# Patient Record
Sex: MALE | Race: WHITE | NOT HISPANIC OR LATINO | URBAN - METROPOLITAN AREA
[De-identification: names, ages, dates, MRNs, and addresses within clinical notes are randomized per-mention and may not be internally consistent; named-entity substitution may affect disease eponyms.]

---

## 2023-07-17 ENCOUNTER — TELEPHONE (OUTPATIENT)
Dept: PRIMARY CARE | Facility: CLINIC | Age: 33
End: 2023-07-17

## 2023-07-17 ENCOUNTER — TELEPHONE (OUTPATIENT)
Dept: PRIMARY CARE | Facility: CLINIC | Age: 33
End: 2023-07-17
Payer: COMMERCIAL

## 2023-07-17 DIAGNOSIS — Z86.19 HISTORY OF COLD SORES: ICD-10-CM

## 2023-07-17 RX ORDER — ACYCLOVIR 200 MG/1
200 CAPSULE ORAL
Qty: 25 CAPSULE | Refills: 0 | Status: SHIPPED | OUTPATIENT
Start: 2023-07-17 | End: 2023-07-22

## 2023-08-18 ENCOUNTER — OFFICE VISIT (OUTPATIENT)
Dept: PRIMARY CARE | Facility: CLINIC | Age: 33
End: 2023-08-18
Payer: COMMERCIAL

## 2023-08-18 VITALS
HEART RATE: 75 BPM | DIASTOLIC BLOOD PRESSURE: 67 MMHG | SYSTOLIC BLOOD PRESSURE: 127 MMHG | BODY MASS INDEX: 24.45 KG/M2 | HEIGHT: 75 IN | OXYGEN SATURATION: 98 % | WEIGHT: 196.6 LBS

## 2023-08-18 DIAGNOSIS — A60.00 HERPES SIMPLEX INFECTION OF GENITOURINARY SYSTEM: ICD-10-CM

## 2023-08-18 DIAGNOSIS — Z00.01 ANNUAL VISIT FOR GENERAL ADULT MEDICAL EXAMINATION WITH ABNORMAL FINDINGS: Primary | ICD-10-CM

## 2023-08-18 PROCEDURE — 99395 PREV VISIT EST AGE 18-39: CPT | Performed by: INTERNAL MEDICINE

## 2023-08-18 PROCEDURE — 1036F TOBACCO NON-USER: CPT | Performed by: INTERNAL MEDICINE

## 2023-08-18 PROCEDURE — 99213 OFFICE O/P EST LOW 20 MIN: CPT | Performed by: INTERNAL MEDICINE

## 2023-08-18 RX ORDER — VALACYCLOVIR HYDROCHLORIDE 500 MG/1
500 TABLET, FILM COATED ORAL DAILY
Qty: 30 TABLET | Refills: 11 | Status: SHIPPED | OUTPATIENT
Start: 2023-08-18 | End: 2024-08-17

## 2023-08-18 RX ORDER — VALACYCLOVIR HYDROCHLORIDE 500 MG/1
500 TABLET, FILM COATED ORAL 2 TIMES DAILY
COMMUNITY

## 2023-08-18 ASSESSMENT — PROMIS GLOBAL HEALTH SCALE
RATE_QUALITY_OF_LIFE: GOOD
CARRYOUT_SOCIAL_ACTIVITIES: FAIR
RATE_AVERAGE_PAIN: 5
RATE_SOCIAL_SATISFACTION: FAIR
RATE_PHYSICAL_HEALTH: GOOD
RATE_MENTAL_HEALTH: POOR
RATE_AVERAGE_FATIGUE: MODERATE
CARRYOUT_PHYSICAL_ACTIVITIES: MOSTLY
RATE_GENERAL_HEALTH: GOOD
EMOTIONAL_PROBLEMS: ALWAYS

## 2023-08-18 NOTE — ASSESSMENT & PLAN NOTE
Skin cancer prostate cancer colon cancer screening flu pneumonia COVID-19 vaccines follow-up every 6 months

## 2023-08-18 NOTE — PROGRESS NOTES
Subjective   Patient ID: Phillip Nino is a 33 y.o. male who presents for Follow-up (Needs tobacco form signed ).    Assessment/Plan     Problem List Items Addressed This Visit       Herpes simplex infection of genitourinary system     Valtrex 500 mg daily check CMP every 6-month         Relevant Medications    valACYclovir (Valtrex) 500 mg tablet    Annual visit for general adult medical examination with abnormal findings - Primary     Skin cancer prostate cancer colon cancer screening flu pneumonia COVID-19 vaccines follow-up every 6 months         Relevant Orders    CBC and Auto Differential    Comprehensive Metabolic Panel    Drug Screen, Urine With Reflex to Confirmation    Hemoglobin A1C    Lipid Panel    TSH with reflex to Free T4 if abnormal    Urinalysis Microscopic Only    Urine Culture     Patient was evaluated today, problem list was reviewed, problems and concerns addressed, Rx list reviewed and updated, lab and tests were noted and reviewed. Life style changes were discussed, always it works better if we eat plant based diet and plenty of fibres and roughage. Consume adequate amount of water and avoid alcohol, light to moderate physical activities and stress reduction are always beneficial for ongoing physical well being. Do not forget to have 6 to 7 hours of sleep regularly and avoid late night arturo screen exposure.    HPI review all STD work-up discussed with the is a 33-year-old patient had a herpes genitalia family history of prostate cancer personal history of STD advised safe sexual    Review all the STD work-up was negative    Continue using condom and Valtrex    Sent for the CBC BMP TSH lipid hemoglobin A1c and urinalysis    Flu pneumonia COVID-19 vaccines    Follow-up in 6 months  Past Medical History:   Diagnosis Date    Anxiety disorder, unspecified 05/13/2021    Anxiety and depression    Decreased white blood cell count, unspecified 08/20/2019    Leukopenia    Generalized anxiety  disorder 06/09/2020    MARCUS (generalized anxiety disorder)    Herpesviral infection, unspecified 05/25/2021    Herpes simplex type 1 infection    Personal history of other diseases of the digestive system 05/25/2021    History of hemorrhoids    Personal history of other diseases of the digestive system 12/20/2019    History of gastroesophageal reflux (GERD)    Personal history of other diseases of the musculoskeletal system and connective tissue 08/01/2019    History of neck pain    Personal history of other diseases of the respiratory system 09/01/2017    History of bronchitis    Personal history of other endocrine, nutritional and metabolic disease 05/25/2021    History of vitamin D deficiency    Personal history of other endocrine, nutritional and metabolic disease 12/20/2019    History of hyperglycemia    Personal history of other mental and behavioral disorders 05/13/2021    History of major depression    Rectal prolapse 08/01/2019    Partial rectal prolapse    Swimmer's ear, bilateral 08/06/2021    Acute swimmer's ear of both sides    Unspecified acute conjunctivitis, bilateral 10/16/2021    Acute conjunctivitis of both eyes, unspecified acute conjunctivitis type     Past Surgical History:   Procedure Laterality Date    OTHER SURGICAL HISTORY  02/21/2017    Oral Surgery    OTHER SURGICAL HISTORY  04/19/2017    Hemorrhoidectomy Doppler-Guided Artery Ligation    TONSILLECTOMY  02/21/2017    Tonsillectomy With Adenoidectomy     No Known Allergies  Current Outpatient Medications   Medication Sig Dispense Refill    valACYclovir (Valtrex) 500 mg tablet Take 1 tablet (500 mg) by mouth 2 times a day.      valACYclovir (Valtrex) 500 mg tablet Take 1 tablet (500 mg) by mouth once daily. 30 tablet 11     No current facility-administered medications for this visit.     No family history on file.  Social History     Socioeconomic History    Marital status: Single     Spouse name: None    Number of children: None    Years of  "education: None    Highest education level: None   Occupational History    None   Tobacco Use    Smoking status: Never    Smokeless tobacco: Never   Substance and Sexual Activity    Alcohol use: None    Drug use: None    Sexual activity: None   Other Topics Concern    None   Social History Narrative    None     Social Determinants of Health     Financial Resource Strain: Not on file   Food Insecurity: Not on file   Transportation Needs: Not on file   Physical Activity: Not on file   Stress: Not on file   Social Connections: Not on file   Intimate Partner Violence: Not on file   Housing Stability: Not on file     Immunization History   Administered Date(s) Administered    Moderna SARS-CoV-2 Vaccination 04/07/2021, 05/05/2021, 02/07/2022       Review of Systems  Review of systems is otherwise negative unless stated above or in history of present illness.    Objective   Visit Vitals  /67 (BP Location: Left arm, Patient Position: Sitting, BP Cuff Size: Adult)   Pulse 75   Ht 1.905 m (6' 3\")   Wt 89.2 kg (196 lb 9.6 oz)   SpO2 98%   BMI 24.57 kg/m²   Smoking Status Never   BSA 2.17 m²     Physical Exam  Constitutional:       General: not in acute distress.   HENT:      Head: Normocephalic and atraumatic.      Nose: Nose normal.   Eyes:      Extraocular Movements: Extraocular movements intact.      Conjunctiva/sclera: Conjunctivae normal.   Cardiovascular:      Rate and Rhythm: Normal rate ,  No M/R/G  Pulmonary:      Effort: Pulmonary effort is normal.      Breath sounds: Normal, Bilat Equal AE  Skin:     General: Skin is warm.   Neurological:      Mental Status: He is alert and oriented to person, place, and time.   Psychiatric:         Mood and Affect: Mood normal.         Behavior: Behavior normal.   Musculoskeletal   FROM in all extremitirs,  Joint-no swelling or tenderness    No visits with results within 4 Month(s) from this visit.   Latest known visit with results is:   Legacy Encounter on 08/26/2022 "   Component Date Value Ref Range Status    Color, Urine 08/26/2022 COLORLESS  STRAW,YELLOW Final    Appearance, Urine 08/26/2022 CLEAR  CLEAR Final    Specific Gravity, Urine 08/26/2022 1.004 (L)  1.005 - 1.035 Final    pH, Urine 08/26/2022 8.0  5.0 - 8.0 Final    Protein, Urine 08/26/2022 NEGATIVE  NEGATIVE mg/dL Final    Glucose, Urine 08/26/2022 NEGATIVE  NEGATIVE mg/dL Final    Blood, Urine 08/26/2022 NEGATIVE  NEGATIVE Final    Ketones, Urine 08/26/2022 NEGATIVE  NEGATIVE mg/dL Final    Bilirubin, Urine 08/26/2022 NEGATIVE  NEGATIVE Final    Urobilinogen, Urine 08/26/2022 <2.0  0.0 - 1.9 mg/dL Final    Nitrite, Urine 08/26/2022 NEGATIVE  NEGATIVE Final    Leukocyte Esterase, Urine 08/26/2022 NEGATIVE  NEGATIVE Final       Radiology: Reviewed imaging in powerchart.  No results found.      Charting was completed using voice recognition technology and may include unintended errors.

## 2023-10-17 ENCOUNTER — TELEPHONE (OUTPATIENT)
Dept: GENETICS | Facility: CLINIC | Age: 33
End: 2023-10-17
Payer: COMMERCIAL

## 2023-10-17 NOTE — TELEPHONE ENCOUNTER
Patient called stating that he is interested in genetic testing after receiving a family letter from his maternal first cousin about positive testing for a known familial variant. Patient stated that his father has genetic counseling scheduled for 10/23/2023. We reviewed that if Phillip's father is negative, there would be no need for him to proceed with testing, as he cannot inherit a variant that his father does not have. If his father is positive, however, he will have a 50% chance of having the variant as well. Patient is agreeable to wait until January for new patient consult, as he would not wish to proceed with testing if his father is negative.     Patient scheduled for Tuesday, 01/23/2024 at 11:00 am for virtual genetic counseling. Scheduling team informed to add patient on. If Phillip, his family members, or his providers have additional questions, I can be contacted directly at [phone].     Josseline High, [facility]

## 2024-01-12 NOTE — TELEPHONE ENCOUNTER
Patient called to confirm appointment time. I confirmed that his appointment is scheduled for Tuesday, 1/23 at 11:00 am. This is a video consult and he will receive a link to his phone and/or email shortly before the appointment time.     I encouraged him to have his family members call and give permission to review the records of their genetic testing for the purpose of his appointment for ease of testing coordination.     Sincerely,  Josseline High [facility]

## 2024-01-23 ENCOUNTER — TELEMEDICINE CLINICAL SUPPORT (OUTPATIENT)
Dept: GENETICS | Facility: CLINIC | Age: 34
End: 2024-01-23
Payer: COMMERCIAL

## 2024-01-23 DIAGNOSIS — Z84.81 FAMILY HISTORY OF BRCA1 GENE POSITIVE: ICD-10-CM

## 2024-01-23 DIAGNOSIS — Z71.83 ENCOUNTER FOR NONPROCREATIVE GENETIC COUNSELING: ICD-10-CM

## 2024-01-23 PROCEDURE — 96040 PR MEDICAL GENETICS COUNSELING EACH 30 MINUTES: CPT

## 2024-01-23 NOTE — PROGRESS NOTES
HISTORY OF PRESENT ILLNESS:  - Mr. Phillip Nino is a 33 y.o. male with a family history of cancer and a known BRCA1 pathogenic variant.  - he was self-referred to the [facility]etics [facility] at [facility] after receiving a family letter from his father about a known familial variant.   - Phillip is interested in genetic testing to clarify potential treatment options, as well as personal and familial risks for cancer.   - The appointment today was conducted via video chat due to the current COVID-19 pandemic.     CANCER MEDICAL HISTORY:  Personal History of Cancer?    None reported   Other ongoing medical concerns?  None reported     PREVIOUS GENETIC TESTING?  -Father, maternal, aunt, and maternal first cousin have a pathogenic variant in the BRCA1 gene called c.[id number] (p,[id number]). Patient's father, Devin Nino  10/04/1957, gave explicit permission to use his genetic counseling records for the purpose of today's appointment.     CANCER SCREENING HISTORY:  PSA:   None reported   Gastrointestinal cancer screening:  Colonoscopy?  None reported   EGD?  None reported   Other cancer screening:  Dermatology?  None reported     SOCIAL HISTORY:  -Former social cigarette smoker              FAMILY HISTORY:  A four-generation pedigree was obtained and was significant for the following:  -Father (living, 66) was diagnosed with an aggressive form of prostate cancer at age 58. He is positive for the familial BRCA1 pathogenic variant described above.  -Paternal aunt (Brittany Millard, living, 67) was diagnosed with breast cancer in her late 30s/early 40s; she had a unilateral mastectomy at the time, and later had prophylactic mastectomy on the other side as well as prophylactic hysterectomy/oophorectomy. She has the pathogenic BRCA1 variant described above. Patient informed me that this aunt was recently diagnosed with recurrent breast cancer in the bones/brain.  ---Paternal first cousin (daughter of the  above aunt, living, 38) was diagnosed with breast cancer at age 38. She has the familial BRCA1 variant as described above.  -Paternal aunt (, late 40s)  of ovarian cancer that was diagnosed in her mid 40s. She reportedly had a mutation in the BRCA1 gene as well.   -Paternal grandfather (, late 60s)  of colon cancer. Patient believes he also had lung cancer, however, it is unclear if these cancers were metastatic from one another, or 2 separate primaries.   Consanguinity and Ashkenazi [employer]regational ancestry were denied. The patient's maternal side is of  descent, and the patient's paternal side is of  descent. The remainder of the family history was negative for intellectual disability, birth defects, miscarriages/stillbirths, blindness, deafness, kidney disease, heart disease, cancer, muscle disease, and blood disorders.    GENETIC COUNSELING:  Mr. Phillip Nino is a 33 y.o. male with a known BRCA1 mutation in his father and other paternal relatives. Based on having a first degree relative with a known pathogenic variant, Phillip meets NCCN criteria for testing of the BRCA1 gene. he is interested in testing, which is recommended, and was ordered today via single gene BRCA1 analysis from [facility]. Our discussion is summarized below.     We reviewed genes and chromosomes, inherited forms of breast and ovarian cancer, and the BRCA1 gene causing HBOC. We discussed that most cancers are not due to an inherited genetic susceptibility. However, in about 5-10% of families, there is an inherited genetic mutation that can make a person more susceptible to developing certain forms of cancer. Within these families, we often see multiple family members with cancer, occurring in multiple generations. In addition, earlier onset and bilateral cancers are suggestive of an inherited form of cancer. Finally, there is a clustering of certain types of cancer in these families, such as breast and  ovarian cancer.     We discussed the BRCA1 gene, which has been linked to early-onset breast and/or ovarian cancer. Changes in this gene (sometimes referred to as mutations) are inherited in a dominant pattern and confer up to an 87% lifetime risk for breast cancer in women. This is elevated compared to the general population risk of 10-12%. Men also have a 0.2-1.2% chance of developing breast cancer in their lives. In addition, BRCA1 mutation carriers have a 39-58% lifetime risk for ovarian cancer, which is elevated over the 2% general population risk. Mutation carriers who have already been diagnosed with cancer have an increased risk to develop a second, contralateral breast cancer. Men and women have a <5% lifetime risk of pancreatic cancer. Finally, males have a 7-26% chance of developing prostate cancer.      We discussed that there are multiple genes associated with increased breast and other cancer risks. Some genes, like the BRCA1 and BRCA2 genes, are considered highly penetrant cancer genes, meaning a mutation in the gene confers a high risk of breast cancer. Additionally, there are other intermediate (moderate risk) breast and/or colon cancer genes. For some of the moderate risk genes, there is often limited information regarding the degree to which a mutation in the gene affects risk of different types of cancers. Additionally, for some of these moderate risk genes, the appropriate management for individuals who have a mutation in one of these genes is not always clear. Our knowledge about the cancer risks associated with mutations in these moderate risk genes is always growing, and we will likely be able to provide more comprehensive information in the future.      Most cancer predisposition genetic changes, such as those described above, are inherited in an autosomal dominant fashion. This means that if an individual has a change in either of these genes, their siblings and children have a 50% chance  of also having that gene change and a 50% chance of not having the gene change. Phillip currently has a 50% chance of having the same change as his father. If he tests POSITIVE, any future children will each have a 50% chance of having the same gene change as well. If he tests NEGATIVE, future children are not at risk of inheriting a family mutation.     We briefly discussed reproductive risks with a BRCA1 mutation. If he is positive, any future reproductive partners may consider screening for the BRCA1 gene as well. If two partners are both carriers of harmful variants in this gene, they would have a 25% chance with each pregnancy to have a child with a more severe childhood onset condition called Fanconi Anemia. There are options such as in vitro fertilization with preimplantation genetic testing as well, which will be discussed in greater detail if Mr. Nino is positive.      Lastly, we discussed the Genetic Information Non-discrimination Act (DAVONTE) of 2008. We discussed that per this federal law, employers (at companies with 15 employees or greater) and health insurance companies (barring [employer] and other  insurances) are forbidden to ask for and use genetic information against another person. As such, health insurance companies cannot ask for genetic information and use findings affect coverage or rates. However, luxury insurances such as life insurance, long term care insurance, and/or private disability insurance companies are not forbidden against using genetic information when an individual takes out a new/additional policy in one of those areas. As such, for unaffected individuals it could be beneficial to explore/take out policies in luxury insurance areas PRIOR to undergoing genetic testing.     Phillip was counseled about hereditary cancer susceptibility including cancer risks, options for increased screening and/or risk reduction, genetic testing, and the implications for other family  members. We discussed performing genetic testing in the context of a multi-gene panel test that looks at the BRCA1 gene, as well as other moderate penetrant genes that could additionally explain other family history. he elected to proceed with the single site testing of the known familial mutation in the BRCA1 gene from [facility].      Results are typically available within 2-3 weeks of sample collection and Phillip will return to the Cancer Genetics Clinic to discuss his testing results. At that time, we will make recommendations for both Phillip and his family members in terms of cancer screening and/or cancer risk reduction options.      PLAN:  1. Phillip elected to undergo genetic testing for just the BRCA1 gene. Consent for testing was obtained verbally and an order for a saliva kit was placed through the laboratory's portal; a saliva kit will be shipped to the home and should arrive in 3-5 days.. The sample will be sent to [facility] for analysis. Results are typically available 2-3 weeks after sample collection.     2. Phillip will return to the Cancer Genetics Clinic in approximately one month to discuss his test results.      3. We remain available to Phillip or his family members at [phone] if any questions arise regarding information discussed at today's visit.     Time spent telecounselin min    Sincerely,   Josseline High [facility]     Reviewed by,  Dr. Temi Jasmine MD  Clinical

## 2024-01-30 ENCOUNTER — TELEPHONE (OUTPATIENT)
Dept: GENETICS | Facility: CLINIC | Age: 34
End: 2024-01-30
Payer: COMMERCIAL

## 2024-01-30 NOTE — TELEPHONE ENCOUNTER
[place] changed the spelling of the patient's name in the portal to be correct. I told the patient to make sure he labels the saliva tube with his full name and date of birth, and this will correctly be linked to his order. If additional questions or concerns arise, he can call me directly at [phone].    Sincerely,   Josseline High, [facility]

## 2024-01-30 NOTE — TELEPHONE ENCOUNTER
Patient called to inform me that his name was spelled incorrectly on the saliva kit that was sent to his home. I will reach out to [facility] to make sure spelling is correct, and contact the patient with appropriate next steps.     Josseline High, [facility]

## 2024-02-13 NOTE — TELEPHONE ENCOUNTER
I spoke with the patient regarding their outstanding genetic testing sample ordered by [facility]. The patient is still interested in testing and plans to submit their sample soon.

## 2024-02-23 ENCOUNTER — TELEPHONE (OUTPATIENT)
Dept: GENETICS | Facility: CLINIC | Age: 34
End: 2024-02-23
Payer: COMMERCIAL

## 2024-02-26 ENCOUNTER — TELEMEDICINE CLINICAL SUPPORT (OUTPATIENT)
Dept: GENETICS | Facility: CLINIC | Age: 34
End: 2024-02-26
Payer: COMMERCIAL

## 2024-02-26 DIAGNOSIS — Z15.09 BRCA1 GENE MUTATION POSITIVE: ICD-10-CM

## 2024-02-26 DIAGNOSIS — Z71.83 ENCOUNTER FOR NONPROCREATIVE GENETIC COUNSELING: ICD-10-CM

## 2024-02-26 DIAGNOSIS — Z15.01 BRCA1 GENE MUTATION POSITIVE: ICD-10-CM

## 2024-02-26 PROCEDURE — 98967 PH1 ASSMT&MGMT NQHP 11-20: CPT

## 2024-02-26 NOTE — PROGRESS NOTES
Mr. Phillip Nino is a 33 y.o. male with a family history of a known BRCA1 mutation in his father. Phillip was initially seen in the [facility] on 01/23/2024 for counseling and coordination of genetic testing. Based on his family history, single gene BRCA1 testing from [email or web address] was ordered. he was scheduled today for a video conference appointment review the results of this testing, and our discussion is summarized below.     Phillip Nino's results show that he is POSITIVE for the known pathogenic (harmful) variant in the BRCA1 gene called c.4035del (p,[id number]*20). This means that he is at increased risk of cancer.        Please see linked media for a copy of the full report, including all genes screened for.     Pathogenic germline variants in the BRCA1 gene are associated with lifetime increased risk for certain types of cancer. For women, these risks include a  55-72% risk for a first breast cancer (by age 70), and a 30-40% chance of a second breast cancer in the next 20 years (NCCN). Women also have a 39-44% risk of ovarian cancer (and related cancers, such as primary peritoneal and fallopian tube cancers).  For men, these risks include an increased risk for prostate cancer (7-26%) and a 1-2% risk for breast cancer.  For both men and women, there is an elevated risk for pancreatic cancer (about 1-3%).  Other cancers can also be seen in BRCA1 mutation carriers, including sometimes melanoma. [Risks from Murphy N, Glenys MB, Esequiel T. BRCA1- and BRCA2-Associated Hereditary Breast and Ovarian Cancer. 1998 Sep 4 [Updated 2022 May 26]. In: Reggie MP, Niranjan HH, Mk RA, et al., editors. takealot.com® [Internet]. [place]): [facility], [place]; 2055-8709. Available from: https://www.ncbi.nlm.nih.gov/books/OUT3010/]     We reviewed the current national (NCCN) guidelines and recommendations for BRCA1 mutation carriers:     Breast cancer risk for males: We discussed options to  screen for male breast cancer including self-exam training and education, clinical breast exam by a primary care provider, and consideration of mammogram for men with gynecomastia beginning at age 50. Mr. Nino declined a referral to the high risk breast cancer clinic today.  Ovarian cancer risk for females: We reviewed that we do not currently have a good screening for ovarian cancer.  We discussed that the national recommendations for female BRCA1 mutation carriers are to have both the ovaries and fallopian tubes surgically removed between the ages of 35 and 40, or, consideration of salpingectomy with delayed oophorectomy.  Given the potential for increased risk for serous uterine cancer in women who are BRCA1 positive, we discussed that hysterectomy can be considered at the same time. For women who do not wish to undergo surgery and her wish to maintain fertility past the age of 30, they can consider ultrasounds of the ovaries and fallopian tubes along with serial CA-125 measurements, however this screening is not known to be very effective and may be of uncertain benefit. There are other non-surgical options available for ovarian cancer risk reduction, which can be further discussed with a gynecologic oncologist for at risk female relatives.  Prostate cancer risk: Men with BRCA1 mutations have an increased risk for prostate cancer, and if a prostate cancer does develop, they have a higher risk of a more aggressive form of prostate cancer. Prostate cancer screening should be considered starting at age 40. This is typically ordered by a primary care provider, and Phillip may be referred to urology if indicated based on abnormal prostate cancer screening. No management changes are recommended right now based on his current age.  Skin cancer risk: We reviewed that BRCA1 mutation carriers may have an increased risk for skin cancer, specifically melanoma.  Based on national guidelines, we discussed the  consideration of yearly skin exams with Dermatology.  Phillip can establish care with a dermatologist for skin cancer screening.   Pancreatic cancer risk: We reviewed the BRCA1 mutation carriers have a slight increased risk for pancreatic cancer.  However at this time, the current national (NCCN) guidelines do not recommend any screening for pancreatic cancer unless an individual has a first- or second-degree relative who also had pancreatic cancer.  Since Phillip has no known family history of pancreatic cancer, screening for this is not indicated at this time. These recommendations may change in the future.   Risk to relatives: We reviewed the chance that other relatives could also have inherited the BRCA1 mutation; each first-degree relative (parent, sibling, child) has a 1 in 2 (50% chance) to also have inherited this mutation.  Phillip declined a family letter today, as his relatives have copies that he can distribute if necessary. Should he have children in the future, they each have a 50% chance of having a BRCA1 mutation as well.   We also discussed Fanconi Anemia (FA), which can occur when a child inherits a BRCA1 mutation from both parents. FA is a disorder that is primarily characterized by progressive bone marrow failure leading to low blood cell counts. Associated symptoms may include anemia due to lack of red blood cells, immunodeficiency due to lack of white blood cells, and failure to clot due to lack of platelets. Affected individuals typically present with a variable spectrum of physical abnormalities & exhibit developmental delay. In addition, FA predisposes individuals to develop several types of cancers including acute myeloid leukemia & solid tumors of the head/neck, esophagus, & genital tract. If he is considering having children, Phillip can consider having his partner undergo genetic testing of the BRCA1 gene, so that they can better understand their children's risk to develop FA. Various  preconception and prenatal testing options exist to learn more about his future children's risks to have a BRCA1 mutation or FA, and he can self-refer for a preconception or prenatal genetic counseling appointment in the future to discuss these options in more detail.  Resources:  We  discussed the patient organization known as [employer], or [employer]acing Our Risk of Cancer [employer].  [employer] is an organization for patients and their families who have hereditary cancer such as BRCA1 in their families.  Their website is [email or web address]://[email or web address]/.     PLAN:  -Phillip stated that he understood the above information, and declined hard copies to be sent in the mail. All information will be available for him to review in [employer].   -I will send the above information to his primary care provider.   -Please keep us apprised as to any changes to personal and/or family history of cancer, and check back with us in 2-3 years to see if the screening recommendations for BRCA1 mutation carriers have changed.  If the patient, his providers, or his family members have any questions following your appointment today, please call me at [phone].     Time spent telecounselin min     Sincerely,   Josseline High MS, [facility]   Licensed and Certified Genetic Counselor     Reviewed by:  Dr. Temi Jasmine MD  Clinical

## 2024-08-23 ENCOUNTER — APPOINTMENT (OUTPATIENT)
Dept: PRIMARY CARE | Facility: CLINIC | Age: 34
End: 2024-08-23
Payer: COMMERCIAL

## 2024-08-23 VITALS
TEMPERATURE: 98.1 F | SYSTOLIC BLOOD PRESSURE: 128 MMHG | BODY MASS INDEX: 23.25 KG/M2 | HEART RATE: 79 BPM | WEIGHT: 187 LBS | HEIGHT: 75 IN | OXYGEN SATURATION: 98 % | DIASTOLIC BLOOD PRESSURE: 85 MMHG

## 2024-08-23 DIAGNOSIS — F12.90 MARIJUANA SMOKER: ICD-10-CM

## 2024-08-23 DIAGNOSIS — I10 HYPERTENSION, UNSPECIFIED TYPE: ICD-10-CM

## 2024-08-23 DIAGNOSIS — F33.1 MODERATE EPISODE OF RECURRENT MAJOR DEPRESSIVE DISORDER (MULTI): ICD-10-CM

## 2024-08-23 DIAGNOSIS — A60.00 HERPES SIMPLEX INFECTION OF GENITOURINARY SYSTEM: ICD-10-CM

## 2024-08-23 DIAGNOSIS — F10.90 ALCOHOL INTAKE ABOVE RECOMMENDED SENSIBLE LIMITS WITHOUT COMPLICATION: ICD-10-CM

## 2024-08-23 DIAGNOSIS — Z00.01 ANNUAL VISIT FOR GENERAL ADULT MEDICAL EXAMINATION WITH ABNORMAL FINDINGS: Primary | ICD-10-CM

## 2024-08-23 LAB
NON-UH HIE A/G RATIO: 1.3
NON-UH HIE ALB: 4.1 G/DL (ref 3.4–5)
NON-UH HIE ALK PHOS: 65 UNIT/L (ref 45–117)
NON-UH HIE AMPHETAMINES, U: NEGATIVE
NON-UH HIE BARBITUATES, U: NEGATIVE
NON-UH HIE BASO COUNT: 0.03 X1000 (ref 0–0.2)
NON-UH HIE BASOS %: 0.9 %
NON-UH HIE BENZODIAZEPINES, U: NEGATIVE
NON-UH HIE BILIRUBIN, TOTAL: 0.7 MG/DL (ref 0.3–1.2)
NON-UH HIE BUN/CREAT RATIO: 8
NON-UH HIE BUN: 8 MG/DL (ref 9–23)
NON-UH HIE CALCIUM: 9.8 MG/DL (ref 8.7–10.4)
NON-UH HIE CALCULATED LDL CHOLESTEROL: 105 MG/DL (ref 60–130)
NON-UH HIE CALCULATED OSMOLALITY: 273 MOSM/KG (ref 275–295)
NON-UH HIE CHLORIDE: 104 MMOL/L (ref 98–107)
NON-UH HIE CHOLESTEROL: 202 MG/DL (ref 100–200)
NON-UH HIE CO2, VENOUS: 29 MMOL/L (ref 20–31)
NON-UH HIE COCAINE, U: NEGATIVE
NON-UH HIE CREATININE: 1 MG/DL (ref 0.6–1.1)
NON-UH HIE DIFF?: NO
NON-UH HIE ECSTASY, U: NEGATIVE
NON-UH HIE EOS COUNT: 0.16 X1000 (ref 0–0.5)
NON-UH HIE EOSIN %: 4.4 %
NON-UH HIE FENTANYL, U: NEGATIVE
NON-UH HIE GFR AA: >60
NON-UH HIE GLOBULIN: 3.1 G/DL
NON-UH HIE GLOMERULAR FILTRATION RATE: >60 ML/MIN/1.73M?
NON-UH HIE GLUCOSE: 76 MG/DL (ref 74–106)
NON-UH HIE GOT: 25 UNIT/L (ref 15–37)
NON-UH HIE GPT: 18 UNIT/L (ref 10–49)
NON-UH HIE HCT: 42 % (ref 41–52)
NON-UH HIE HDL CHOLESTEROL: 72 MG/DL (ref 40–60)
NON-UH HIE HGB A1C: 4.7 %
NON-UH HIE HGB: 14.5 G/DL (ref 13.5–17.5)
NON-UH HIE INSTR WBC: 3.7
NON-UH HIE K: 4 MMOL/L (ref 3.5–5.1)
NON-UH HIE LYMPH %: 35.9 %
NON-UH HIE LYMPH COUNT: 1.32 X1000 (ref 1.2–4.8)
NON-UH HIE MAGNESIUM: 1.8 MG/DL (ref 1.6–2.6)
NON-UH HIE MCH: 30.8 PG (ref 27–34)
NON-UH HIE MCHC: 34.4 G/DL (ref 32–37)
NON-UH HIE MCV: 89.3 FL (ref 80–100)
NON-UH HIE MONO %: 9.2 %
NON-UH HIE MONO COUNT: 0.34 X1000 (ref 0.1–1)
NON-UH HIE MPV: 6.7 FL (ref 7.4–10.4)
NON-UH HIE NA: 138 MMOL/L (ref 135–145)
NON-UH HIE NEUTROPHIL %: 49.6 %
NON-UH HIE NEUTROPHIL COUNT (ANC): 1.83 X1000 (ref 1.4–8.8)
NON-UH HIE NUCLEATED RBC: 0 /100WBC
NON-UH HIE OPIATES, U: NEGATIVE
NON-UH HIE PCP, U: NEGATIVE
NON-UH HIE PLATELET: 277 X10 (ref 150–450)
NON-UH HIE PROSTATIC SPECIFIC AG SCREEN: 1 NG/ML (ref 0–4)
NON-UH HIE RBC: 4.7 X10 (ref 4.7–6.1)
NON-UH HIE RDW: 12.6 % (ref 11.5–14.5)
NON-UH HIE THC, U: POSITIVE
NON-UH HIE TOTAL CHOL/HDL CHOL RATIO: 2.8
NON-UH HIE TOTAL PROTEIN: 7.2 G/DL (ref 5.7–8.2)
NON-UH HIE TRIGLYCERIDES: 127 MG/DL (ref 30–150)
NON-UH HIE TSH: 1 UIU/ML (ref 0.55–4.78)
NON-UH HIE WBC: 3.7 X10 (ref 4.5–11)

## 2024-08-23 PROCEDURE — 99213 OFFICE O/P EST LOW 20 MIN: CPT | Performed by: INTERNAL MEDICINE

## 2024-08-23 PROCEDURE — 3008F BODY MASS INDEX DOCD: CPT | Performed by: INTERNAL MEDICINE

## 2024-08-23 PROCEDURE — 3079F DIAST BP 80-89 MM HG: CPT | Performed by: INTERNAL MEDICINE

## 2024-08-23 PROCEDURE — 99395 PREV VISIT EST AGE 18-39: CPT | Performed by: INTERNAL MEDICINE

## 2024-08-23 PROCEDURE — 1036F TOBACCO NON-USER: CPT | Performed by: INTERNAL MEDICINE

## 2024-08-23 PROCEDURE — 3074F SYST BP LT 130 MM HG: CPT | Performed by: INTERNAL MEDICINE

## 2024-08-23 RX ORDER — VALACYCLOVIR HYDROCHLORIDE 500 MG/1
500 TABLET, FILM COATED ORAL DAILY
Qty: 90 TABLET | Refills: 3 | Status: SHIPPED | OUTPATIENT
Start: 2024-08-23 | End: 2025-08-23

## 2024-08-23 ASSESSMENT — PATIENT HEALTH QUESTIONNAIRE - PHQ9
4. FEELING TIRED OR HAVING LITTLE ENERGY: NEARLY EVERY DAY
1. LITTLE INTEREST OR PLEASURE IN DOING THINGS: SEVERAL DAYS
3. TROUBLE FALLING OR STAYING ASLEEP OR SLEEPING TOO MUCH: MORE THAN HALF THE DAYS
SUM OF ALL RESPONSES TO PHQ9 QUESTIONS 1 AND 2: 3
5. POOR APPETITE OR OVEREATING: NEARLY EVERY DAY
7. TROUBLE CONCENTRATING ON THINGS, SUCH AS READING THE NEWSPAPER OR WATCHING TELEVISION: NEARLY EVERY DAY
SUM OF ALL RESPONSES TO PHQ QUESTIONS 1-9: 20
10. IF YOU CHECKED OFF ANY PROBLEMS, HOW DIFFICULT HAVE THESE PROBLEMS MADE IT FOR YOU TO DO YOUR WORK, TAKE CARE OF THINGS AT HOME, OR GET ALONG WITH OTHER PEOPLE: EXTREMELY DIFFICULT
8. MOVING OR SPEAKING SO SLOWLY THAT OTHER PEOPLE COULD HAVE NOTICED. OR THE OPPOSITE, BEING SO FIGETY OR RESTLESS THAT YOU HAVE BEEN MOVING AROUND A LOT MORE THAN USUAL: NOT AT ALL
2. FEELING DOWN, DEPRESSED OR HOPELESS: MORE THAN HALF THE DAYS
6. FEELING BAD ABOUT YOURSELF - OR THAT YOU ARE A FAILURE OR HAVE LET YOURSELF OR YOUR FAMILY DOWN: NEARLY EVERY DAY
9. THOUGHTS THAT YOU WOULD BE BETTER OFF DEAD, OR OF HURTING YOURSELF: NEARLY EVERY DAY

## 2024-08-23 NOTE — PROGRESS NOTES
Subjective   Patient ID: Phillip Nino is a 34 y.o. male who presents for Annual Exam.    Assessment/Plan     Problem List Items Addressed This Visit       Herpes simplex infection of genitourinary system    Relevant Medications    valACYclovir (Valtrex) 500 mg tablet    Other Relevant Orders    CBC and Auto Differential    Comprehensive Metabolic Panel    Hemoglobin A1C    Lipid Panel    Magnesium    TSH with reflex to Free T4 if abnormal    Prostate Specific Antigen, Screen    Urine Culture    Drug Screen, Urine With Reflex to Confirmation    Annual visit for general adult medical examination with abnormal findings - Primary    Relevant Orders    CBC and Auto Differential    Comprehensive Metabolic Panel    Hemoglobin A1C    Lipid Panel    Magnesium    TSH with reflex to Free T4 if abnormal    Prostate Specific Antigen, Screen    Urine Culture    Drug Screen, Urine With Reflex to Confirmation    Moderate episode of recurrent major depressive disorder (Multi)     Not suicidal  Vm  Depression is chronic and quite common and notorious mental health disorder and it is quite common and widespread, there are several therapeutics available for depression now a days. It is considered as chemical imbalance disorder and with medications , it can be adjusted. There are side effects from SSRI and SNRIs but on long term, they are well tolerated. Please do not feel awkward or shy to contact us if feel tired, sleepy, lack of energy or aloof/ alone. Untreated depression can bring serious consequences including suicidal ideations, mental health counselling is available if need arises. Usually treatment of depression is long lasting therapy with periodic evaluations and follow ups. Pt with depression no longer have to feel frustrated, helpless or isolated.Detailed discussion was carried out.           Relevant Orders    CBC and Auto Differential    Comprehensive Metabolic Panel    Hemoglobin A1C    Lipid Panel    Magnesium     TSH with reflex to Free T4 if abnormal    Prostate Specific Antigen, Screen    Urine Culture    Drug Screen, Urine With Reflex to Confirmation    Hypertension     Cut down caffeine and salt and alcohol         Relevant Orders    CBC and Auto Differential    Comprehensive Metabolic Panel    Hemoglobin A1C    Lipid Panel    Magnesium    TSH with reflex to Free T4 if abnormal    Prostate Specific Antigen, Screen    Urine Culture    Drug Screen, Urine With Reflex to Confirmation    CT cardiac scoring wo IV contrast    Alcohol intake above recommended sensible limits without complication     Refer to psych chemical dependency program check B12 folic acid thiamine         Marijuana smoker     Advise stop drinking and smoking            HPI 40-year-old patient engage no children 2 brother positive for hypertension    Mother of hypertension father prostate cancer    History of wisdom tooth surgery and hemorrhoid surgery    Personal history of smoking marijuana cigarette and moderate alcohol intake    Aunt have breast cancer cousin had breast cancer    Complaining anxiety depression insomnia fatigue tired potentially for chemical abuse    Negative for suicide homicide ideation    Negative for headache chest pain hematuria rectal bleeding or jaundice or weight loss or STD.  No jaundice  Past Medical History:   Diagnosis Date    Anxiety disorder, unspecified 05/13/2021    Anxiety and depression    Decreased white blood cell count, unspecified 08/20/2019    Leukopenia    Generalized anxiety disorder 06/09/2020    MARCUS (generalized anxiety disorder)    Herpesviral infection, unspecified 05/25/2021    Herpes simplex type 1 infection    Personal history of other diseases of the digestive system 05/25/2021    History of hemorrhoids    Personal history of other diseases of the digestive system 12/20/2019    History of gastroesophageal reflux (GERD)    Personal history of other diseases of the musculoskeletal system and connective  tissue 08/01/2019    History of neck pain    Personal history of other diseases of the respiratory system 09/01/2017    History of bronchitis    Personal history of other endocrine, nutritional and metabolic disease 05/25/2021    History of vitamin D deficiency    Personal history of other endocrine, nutritional and metabolic disease 12/20/2019    History of hyperglycemia    Personal history of other mental and behavioral disorders 05/13/2021    History of major depression    Rectal prolapse 08/01/2019    Partial rectal prolapse    Swimmer's ear, bilateral 08/06/2021    Acute swimmer's ear of both sides    Unspecified acute conjunctivitis, bilateral 10/16/2021    Acute conjunctivitis of both eyes, unspecified acute conjunctivitis type     Past Surgical History:   Procedure Laterality Date    OTHER SURGICAL HISTORY  02/21/2017    Oral Surgery    OTHER SURGICAL HISTORY  04/19/2017    Hemorrhoidectomy Doppler-Guided Artery Ligation    TONSILLECTOMY  02/21/2017    Tonsillectomy With Adenoidectomy     No Known Allergies  Current Outpatient Medications   Medication Sig Dispense Refill    valACYclovir (Valtrex) 500 mg tablet Take 1 tablet (500 mg) by mouth once daily. 90 tablet 3     No current facility-administered medications for this visit.     No family history on file.  Social History     Socioeconomic History    Marital status: Single   Tobacco Use    Smoking status: Never    Smokeless tobacco: Never   Substance and Sexual Activity    Alcohol use: Not Currently     Alcohol/week: 0.0 - 40.0 standard drinks of alcohol    Drug use: Yes     Types: Marijuana     Immunization History   Administered Date(s) Administered    Hepatitis A vaccine, age 19 years and greater (HAVRIX) 02/13/2019, 08/14/2019    Meningococcal ACWY-D (Menactra) 4-valent conjugate vaccine 06/13/2008    Moderna SARS-CoV-2 Vaccination 04/07/2021, 05/05/2021, 02/07/2022    Tdap vaccine, age 7 year and older (BOOSTRIX, ADACEL) 06/13/2008, 02/13/2019     "Typhoid, ViCPs 02/13/2019       Review of Systems  Review of systems is otherwise negative unless stated above or in history of present illness.    Objective   Visit Vitals  /85   Pulse 79   Temp 36.7 °C (98.1 °F)   Ht 1.905 m (6' 3\")   Wt 84.8 kg (187 lb)   SpO2 98%   BMI 23.37 kg/m²   Smoking Status Never   BSA 2.12 m²     Physical Exam  Constitutional:       General: not in acute distress.   HENT: Dryness of your canal external     Head: Normocephalic and atraumatic.      Nose: Nose normal.   Eyes: No jaundice     Extraocular Movements: Extraocular movements intact.      Conjunctiva/sclera: Conjunctivae normal.   Cardiovascular:      Rate and Rhythm: Normal rate ,  No M/R/G  Pulmonary: Rhonchi     Effort: Pulmonary effort is normal.      Breath sounds: Normal, Bilat Equal AE  Skin: Dry skin     General: Skin is warm.   Neurological:      Mental Status: He is alert and oriented to person, place, and time.   Psychiatric:   Anxiety depression without suicide   Mood and Affect: Mood normal.         Behavior: Behavior normal.   Musculoskeletal   FROM in all extremitirs,  Joint-no swelling or tenderness    No visits with results within 4 Month(s) from this visit.   Latest known visit with results is:   Legacy Encounter on 08/26/2022   Component Date Value Ref Range Status    Color, Urine 08/26/2022 COLORLESS  STRAW,YELLOW Final    Appearance, Urine 08/26/2022 CLEAR  CLEAR Final    Specific Gravity, Urine 08/26/2022 1.004 (L)  1.005 - 1.035 Final    pH, Urine 08/26/2022 8.0  5.0 - 8.0 Final    Protein, Urine 08/26/2022 NEGATIVE  NEGATIVE mg/dL Final    Glucose, Urine 08/26/2022 NEGATIVE  NEGATIVE mg/dL Final    Blood, Urine 08/26/2022 NEGATIVE  NEGATIVE Final    Ketones, Urine 08/26/2022 NEGATIVE  NEGATIVE mg/dL Final    Bilirubin, Urine 08/26/2022 NEGATIVE  NEGATIVE Final    Urobilinogen, Urine 08/26/2022 <2.0  0.0 - 1.9 mg/dL Final    Nitrite, Urine 08/26/2022 NEGATIVE  NEGATIVE Final    Leukocyte Esterase, " Urine 08/26/2022 NEGATIVE  NEGATIVE Final       Radiology: Reviewed imaging in powerchart.  No results found.      Charting was completed using voice recognition technology and may include unintended errors.

## 2024-08-23 NOTE — ASSESSMENT & PLAN NOTE
Not suicidal  Vm  Depression is chronic and quite common and notorious mental health disorder and it is quite common and widespread, there are several therapeutics available for depression now a days. It is considered as chemical imbalance disorder and with medications , it can be adjusted. There are side effects from SSRI and SNRIs but on long term, they are well tolerated. Please do not feel awkward or shy to contact us if feel tired, sleepy, lack of energy or aloof/ alone. Untreated depression can bring serious consequences including suicidal ideations, mental health counselling is available if need arises. Usually treatment of depression is long lasting therapy with periodic evaluations and follow ups. Pt with depression no longer have to feel frustrated, helpless or isolated.Detailed discussion was carried out.

## 2024-08-26 ENCOUNTER — TELEPHONE (OUTPATIENT)
Dept: PRIMARY CARE | Facility: CLINIC | Age: 34
End: 2024-08-26
Payer: COMMERCIAL

## 2024-08-26 NOTE — TELEPHONE ENCOUNTER
----- Message from Wanda Rodriguez sent at 8/26/2024  8:48 AM EDT -----  BUN is 8 low osmolality low cholesterol 202 WBC low thyroid and diabetes negative magnesium normal PSA normal marijuana positive    Low-fat diet    Multivitamin B12 folic acid OTC 1 a day follow-up 3 months repeat CBC lipid 3 months

## 2025-08-29 ENCOUNTER — APPOINTMENT (OUTPATIENT)
Dept: PRIMARY CARE | Facility: CLINIC | Age: 35
End: 2025-08-29
Payer: COMMERCIAL

## 2025-08-29 VITALS
BODY MASS INDEX: 26.11 KG/M2 | DIASTOLIC BLOOD PRESSURE: 93 MMHG | SYSTOLIC BLOOD PRESSURE: 147 MMHG | TEMPERATURE: 97.5 F | OXYGEN SATURATION: 99 % | WEIGHT: 210 LBS | HEIGHT: 75 IN | HEART RATE: 83 BPM

## 2025-08-29 DIAGNOSIS — A60.00 HERPES SIMPLEX INFECTION OF GENITOURINARY SYSTEM: ICD-10-CM

## 2025-08-29 DIAGNOSIS — F12.90 MARIJUANA SMOKER: ICD-10-CM

## 2025-08-29 DIAGNOSIS — I10 PRIMARY HYPERTENSION: ICD-10-CM

## 2025-08-29 DIAGNOSIS — F33.1 MODERATE EPISODE OF RECURRENT MAJOR DEPRESSIVE DISORDER: ICD-10-CM

## 2025-08-29 DIAGNOSIS — F10.90 ALCOHOL INTAKE ABOVE RECOMMENDED SENSIBLE LIMITS WITHOUT COMPLICATION: ICD-10-CM

## 2025-08-29 DIAGNOSIS — Z00.01 ANNUAL VISIT FOR GENERAL ADULT MEDICAL EXAMINATION WITH ABNORMAL FINDINGS: Primary | ICD-10-CM

## 2025-08-29 PROCEDURE — 99215 OFFICE O/P EST HI 40 MIN: CPT | Performed by: INTERNAL MEDICINE

## 2025-08-29 PROCEDURE — 3080F DIAST BP >= 90 MM HG: CPT | Performed by: INTERNAL MEDICINE

## 2025-08-29 PROCEDURE — 3077F SYST BP >= 140 MM HG: CPT | Performed by: INTERNAL MEDICINE

## 2025-08-29 PROCEDURE — 1036F TOBACCO NON-USER: CPT | Performed by: INTERNAL MEDICINE

## 2025-08-29 PROCEDURE — 99395 PREV VISIT EST AGE 18-39: CPT | Performed by: INTERNAL MEDICINE

## 2025-08-29 PROCEDURE — 3008F BODY MASS INDEX DOCD: CPT | Performed by: INTERNAL MEDICINE

## 2025-08-29 RX ORDER — RAMIPRIL 5 MG/1
5 CAPSULE ORAL DAILY
Qty: 90 CAPSULE | Refills: 3 | Status: SHIPPED | OUTPATIENT
Start: 2025-08-29 | End: 2026-08-29

## 2025-08-29 RX ORDER — ESCITALOPRAM OXALATE 10 MG/1
10 TABLET ORAL DAILY
Qty: 90 TABLET | Refills: 3 | Status: SHIPPED | OUTPATIENT
Start: 2025-08-29

## 2025-08-29 RX ORDER — VALACYCLOVIR HYDROCHLORIDE 500 MG/1
500 TABLET, FILM COATED ORAL DAILY
Qty: 90 TABLET | Refills: 3 | Status: SHIPPED | OUTPATIENT
Start: 2025-08-29 | End: 2026-08-29

## 2025-08-29 ASSESSMENT — PATIENT HEALTH QUESTIONNAIRE - PHQ9
2. FEELING DOWN, DEPRESSED OR HOPELESS: NOT AT ALL
1. LITTLE INTEREST OR PLEASURE IN DOING THINGS: NOT AT ALL
SUM OF ALL RESPONSES TO PHQ9 QUESTIONS 1 AND 2: 0

## 2025-08-30 LAB
ALBUMIN SERPL-MCNC: 4.9 G/DL (ref 3.6–5.1)
ALP SERPL-CCNC: 62 U/L (ref 36–130)
ALT SERPL-CCNC: 14 U/L (ref 9–46)
ANION GAP SERPL CALCULATED.4IONS-SCNC: 9 MMOL/L (CALC) (ref 7–17)
AST SERPL-CCNC: 19 U/L (ref 10–40)
BASOPHILS # BLD AUTO: 29 CELLS/UL (ref 0–200)
BASOPHILS NFR BLD AUTO: 0.6 %
BILIRUB SERPL-MCNC: 0.8 MG/DL (ref 0.2–1.2)
BUN SERPL-MCNC: 13 MG/DL (ref 7–25)
CALCIUM SERPL-MCNC: 10.2 MG/DL (ref 8.6–10.3)
CHLORIDE SERPL-SCNC: 102 MMOL/L (ref 98–110)
CHOLEST SERPL-MCNC: 240 MG/DL
CHOLEST/HDLC SERPL: 3.2 (CALC)
CO2 SERPL-SCNC: 29 MMOL/L (ref 20–32)
CREAT SERPL-MCNC: 1.12 MG/DL (ref 0.6–1.26)
EGFRCR SERPLBLD CKD-EPI 2021: 89 ML/MIN/1.73M2
EOSINOPHIL # BLD AUTO: 181 CELLS/UL (ref 15–500)
EOSINOPHIL NFR BLD AUTO: 3.7 %
ERYTHROCYTE [DISTWIDTH] IN BLOOD BY AUTOMATED COUNT: 12.3 % (ref 11–15)
EST. AVERAGE GLUCOSE BLD GHB EST-MCNC: 103 MG/DL
EST. AVERAGE GLUCOSE BLD GHB EST-SCNC: 5.7 MMOL/L
GLUCOSE SERPL-MCNC: 89 MG/DL (ref 65–99)
HBA1C MFR BLD: 5.2 %
HCT VFR BLD AUTO: 48.7 % (ref 38.5–50)
HDLC SERPL-MCNC: 75 MG/DL
HGB BLD-MCNC: 16.3 G/DL (ref 13.2–17.1)
LDLC SERPL CALC-MCNC: 135 MG/DL (CALC)
LYMPHOCYTES # BLD AUTO: 1504 CELLS/UL (ref 850–3900)
LYMPHOCYTES NFR BLD AUTO: 30.7 %
MCH RBC QN AUTO: 30.4 PG (ref 27–33)
MCHC RBC AUTO-ENTMCNC: 33.5 G/DL (ref 32–36)
MCV RBC AUTO: 90.9 FL (ref 80–100)
MONOCYTES # BLD AUTO: 519 CELLS/UL (ref 200–950)
MONOCYTES NFR BLD AUTO: 10.6 %
NEUTROPHILS # BLD AUTO: 2666 CELLS/UL (ref 1500–7800)
NEUTROPHILS NFR BLD AUTO: 54.4 %
NONHDLC SERPL-MCNC: 165 MG/DL (CALC)
PLATELET # BLD AUTO: 328 THOUSAND/UL (ref 140–400)
PMV BLD REES-ECKER: 8.9 FL (ref 7.5–12.5)
POTASSIUM SERPL-SCNC: 4.7 MMOL/L (ref 3.5–5.3)
PROT SERPL-MCNC: 7.1 G/DL (ref 6.1–8.1)
PSA SERPL-MCNC: 1.06 NG/ML
RBC # BLD AUTO: 5.36 MILLION/UL (ref 4.2–5.8)
SODIUM SERPL-SCNC: 140 MMOL/L (ref 135–146)
TRIGL SERPL-MCNC: 167 MG/DL
TSH SERPL-ACNC: 1.57 MIU/L (ref 0.4–4.5)
WBC # BLD AUTO: 4.9 THOUSAND/UL (ref 3.8–10.8)

## 2025-09-02 ENCOUNTER — TELEPHONE (OUTPATIENT)
Dept: PRIMARY CARE | Facility: CLINIC | Age: 35
End: 2025-09-02
Payer: COMMERCIAL

## 2025-10-10 ENCOUNTER — APPOINTMENT (OUTPATIENT)
Dept: PRIMARY CARE | Facility: CLINIC | Age: 35
End: 2025-10-10
Payer: COMMERCIAL